# Patient Record
Sex: FEMALE | Race: WHITE | NOT HISPANIC OR LATINO | URBAN - METROPOLITAN AREA
[De-identification: names, ages, dates, MRNs, and addresses within clinical notes are randomized per-mention and may not be internally consistent; named-entity substitution may affect disease eponyms.]

---

## 2022-03-21 ENCOUNTER — EMERGENCY (EMERGENCY)
Facility: HOSPITAL | Age: 72
LOS: 1 days | Discharge: HOME | End: 2022-03-21
Admitting: EMERGENCY MEDICINE
Payer: MEDICARE

## 2022-03-21 VITALS
HEART RATE: 75 BPM | TEMPERATURE: 97 F | OXYGEN SATURATION: 99 % | SYSTOLIC BLOOD PRESSURE: 123 MMHG | RESPIRATION RATE: 18 BRPM | HEIGHT: 67 IN | DIASTOLIC BLOOD PRESSURE: 45 MMHG

## 2022-03-21 DIAGNOSIS — Z98.89 OTHER SPECIFIED POSTPROCEDURAL STATES: Chronic | ICD-10-CM

## 2022-03-21 DIAGNOSIS — S62.101S FRACTURE OF UNSPECIFIED CARPAL BONE, RIGHT WRIST, SEQUELA: Chronic | ICD-10-CM

## 2022-03-21 DIAGNOSIS — Z46.51 ENCOUNTER FOR FITTING AND ADJUSTMENT OF GASTRIC LAP BAND: Chronic | ICD-10-CM

## 2022-03-21 PROCEDURE — 99283 EMERGENCY DEPT VISIT LOW MDM: CPT | Mod: FS

## 2022-03-21 RX ORDER — TETANUS TOXOID, REDUCED DIPHTHERIA TOXOID AND ACELLULAR PERTUSSIS VACCINE, ADSORBED 5; 2.5; 8; 8; 2.5 [IU]/.5ML; [IU]/.5ML; UG/.5ML; UG/.5ML; UG/.5ML
0.5 SUSPENSION INTRAMUSCULAR ONCE
Refills: 0 | Status: COMPLETED | OUTPATIENT
Start: 2022-03-21 | End: 2022-03-21

## 2022-03-21 RX ORDER — TRANEXAMIC ACID 100 MG/ML
5 INJECTION, SOLUTION INTRAVENOUS ONCE
Refills: 0 | Status: COMPLETED | OUTPATIENT
Start: 2022-03-21 | End: 2022-03-21

## 2022-03-21 RX ADMIN — TRANEXAMIC ACID 5 MILLILITER(S): 100 INJECTION, SOLUTION INTRAVENOUS at 21:36

## 2022-03-21 RX ADMIN — TETANUS TOXOID, REDUCED DIPHTHERIA TOXOID AND ACELLULAR PERTUSSIS VACCINE, ADSORBED 0.5 MILLILITER(S): 5; 2.5; 8; 8; 2.5 SUSPENSION INTRAMUSCULAR at 19:44

## 2022-03-21 NOTE — ED PROVIDER NOTE - NSICDXPASTSURGICALHX_GEN_ALL_CORE_FT
PAST SURGICAL HISTORY:  Fitting and adjustment of gastric lap band     Fracture of right wrist, sequela     S/P laparoscopic cholecystectomy

## 2022-03-21 NOTE — ED PROVIDER NOTE - NSICDXFAMILYHX_GEN_ALL_CORE_FT
No FAMILY HISTORY:  Family history of asthma    Sibling  Still living? Unknown  Family history of asthma, Age at diagnosis: Age Unknown

## 2022-03-21 NOTE — ED PROVIDER NOTE - OBJECTIVE STATEMENT
70 y/o F hx HTN and afib on Eliquis here c/o bleeding laceration to the left hand x 2 days. States she was cutting meat when she the palmar aspect of her left hand. States she cleaned the area and wrapped it at the time. Concerned for continued bleeding. Held her Eliquis yesterday. R hand dominant. Tetanus status unknown, no numbness/tingling/weakness to the hand.

## 2022-03-21 NOTE — ED PROVIDER NOTE - PHYSICAL EXAMINATION
Well appearing, well nourished, awake, alert, oriented to person, place, time/situation and in no apparent distress.    Airway patent    Eyes without scleral injection. No jaundice.    Strong pulse.    Respirations unlabored.      Spine appears normal, range of motion is not limited, no muscle or joint tenderness.    Alert and oriented, no gross motor or sensory deficits.    Skin normal color for race, warm, dry.    No SI/HI.    L hand: 2mm bleeding wound to the thenar eminence, controlled with holding pressure, no underlying erythema or warmth/tenderness to touch, no red streaking, no drainage, full ROM of all digits against resistance, radial pulse 2+, sensation intact, NVI

## 2022-03-21 NOTE — ED PROVIDER NOTE - NSICDXPASTMEDICALHX_GEN_ALL_CORE_FT
PAST MEDICAL HISTORY:  Atrial fibrillation, unspecified     Essential hypertension     Morbid obesity

## 2022-03-21 NOTE — ED PROVIDER NOTE - PRINCIPAL DIAGNOSIS
Laceration of finger of left hand without foreign body, initial encounter Laceration of skin of left hand

## 2022-03-21 NOTE — ED PROVIDER NOTE - PROGRESS NOTE DETAILS
TULIO Garcia: patient received sign out by TULIO Berkowitz, awaiting topical txa from pharmacy. Medication to be sent. Light bleeding seen through gauze, pending txa. Patient signed out to TULIO Marshall TULIO Garcia: patient received sign out by TULIO Berkowitz, awaiting topical txa from pharmacy. Medication to be sent. Light bleeding seen through gauze, pending txa. Patient signed out to TULIO Grewal TULIO Grewal: Received signout on pt. Dressing with TXA applied, hemostasis achieved, will D/C with outpatient follow up.

## 2022-03-21 NOTE — ED PROVIDER NOTE - PATIENT PORTAL LINK FT
You can access the FollowMyHealth Patient Portal offered by [facility] by registering at the following website: [email or web address]. By joining [facility]’s FollowMyHealth portal, you will also be able to view your health information using other applications (apps) compatible with our system.

## 2022-03-21 NOTE — ED PROVIDER NOTE - NSFOLLOWUPINSTRUCTIONS_ED_ALL_ED_FT
See your primary care doctor within 24-48 hours, bring copies of all reports with you. Keep the wound covered and dry.  Return to the ER for worsening symptoms or any other concerns. See your primary care doctor within 24-48 hours, bring copies of all reports with you. Keep the wound covered and dry.  Return to the ER for worsening symptoms or any other concerns. This includes but is not limited to increasing redness, swelling, pain or for any other symptoms that concern you.

## 2022-03-21 NOTE — ED PROVIDER NOTE - CLINICAL SUMMARY MEDICAL DECISION MAKING FREE TEXT BOX
70 y/o F W/ 2 day old superficial bleeding laceration, bleeding controlled with direct pressure. Area cleaned with chlorhexidine, Surgicel applied with hemostasis. Will update tetanus, return precautions given. 72 y/o F W/ 2 day old superficial bleeding laceration, bleeding slowed but still active. Area cleaned with chlorhexidine, Surgicel applied with hemostasis. Will update tetanus, return precautions given.

## 2022-03-21 NOTE — ED PROVIDER NOTE - CARE PLAN
Principal Discharge DX:	Laceration of finger of left hand without foreign body, initial encounter   1 Principal Discharge DX:	Laceration of skin of left hand

## 2022-03-21 NOTE — ED ADULT TRIAGE NOTE - CHIEF COMPLAINT QUOTE
Pt with laceration to left palm of hand . Pt knife cut her  this past saturday. Pt arrives with active bleeding new pressure dressing applied . pt states on eliquis did not take dose today.

## 2022-03-21 NOTE — ED PROVIDER NOTE - NS ED ATTENDING STATEMENT MOD
This was a shared visit with the SHELLY. I reviewed and verified the documentation and independently performed the documented:

## 2022-04-20 ENCOUNTER — EMERGENCY (EMERGENCY)
Facility: HOSPITAL | Age: 72
LOS: 1 days | Discharge: HOME | End: 2022-04-20
Attending: EMERGENCY MEDICINE | Admitting: EMERGENCY MEDICINE
Payer: MEDICARE

## 2022-04-20 VITALS
HEIGHT: 67 IN | RESPIRATION RATE: 18 BRPM | OXYGEN SATURATION: 100 % | HEART RATE: 55 BPM | TEMPERATURE: 99 F | DIASTOLIC BLOOD PRESSURE: 85 MMHG | SYSTOLIC BLOOD PRESSURE: 143 MMHG

## 2022-04-20 VITALS
TEMPERATURE: 98 F | HEART RATE: 94 BPM | RESPIRATION RATE: 16 BRPM | SYSTOLIC BLOOD PRESSURE: 132 MMHG | OXYGEN SATURATION: 100 % | DIASTOLIC BLOOD PRESSURE: 94 MMHG

## 2022-04-20 DIAGNOSIS — Z46.51 ENCOUNTER FOR FITTING AND ADJUSTMENT OF GASTRIC LAP BAND: Chronic | ICD-10-CM

## 2022-04-20 DIAGNOSIS — S62.101S FRACTURE OF UNSPECIFIED CARPAL BONE, RIGHT WRIST, SEQUELA: Chronic | ICD-10-CM

## 2022-04-20 DIAGNOSIS — Z98.89 OTHER SPECIFIED POSTPROCEDURAL STATES: Chronic | ICD-10-CM

## 2022-04-20 LAB
ALBUMIN SERPL ELPH-MCNC: 4.6 G/DL — SIGNIFICANT CHANGE UP (ref 3.3–5)
ALP SERPL-CCNC: 92 U/L — SIGNIFICANT CHANGE UP (ref 40–120)
ALT FLD-CCNC: 15 U/L — SIGNIFICANT CHANGE UP (ref 4–33)
ANION GAP SERPL CALC-SCNC: 15 MMOL/L — HIGH (ref 7–14)
APTT BLD: 33.6 SEC — SIGNIFICANT CHANGE UP (ref 27–36.3)
AST SERPL-CCNC: 23 U/L — SIGNIFICANT CHANGE UP (ref 4–32)
BASOPHILS # BLD AUTO: 0.01 K/UL — SIGNIFICANT CHANGE UP (ref 0–0.2)
BASOPHILS NFR BLD AUTO: 0.2 % — SIGNIFICANT CHANGE UP (ref 0–2)
BILIRUB SERPL-MCNC: 0.5 MG/DL — SIGNIFICANT CHANGE UP (ref 0.2–1.2)
BLD GP AB SCN SERPL QL: NEGATIVE — SIGNIFICANT CHANGE UP
BUN SERPL-MCNC: 21 MG/DL — SIGNIFICANT CHANGE UP (ref 7–23)
CALCIUM SERPL-MCNC: 9.6 MG/DL — SIGNIFICANT CHANGE UP (ref 8.4–10.5)
CHLORIDE SERPL-SCNC: 106 MMOL/L — SIGNIFICANT CHANGE UP (ref 98–107)
CO2 SERPL-SCNC: 24 MMOL/L — SIGNIFICANT CHANGE UP (ref 22–31)
CREAT SERPL-MCNC: 0.94 MG/DL — SIGNIFICANT CHANGE UP (ref 0.5–1.3)
EGFR: 64 ML/MIN/1.73M2 — SIGNIFICANT CHANGE UP
EOSINOPHIL # BLD AUTO: 0.14 K/UL — SIGNIFICANT CHANGE UP (ref 0–0.5)
EOSINOPHIL NFR BLD AUTO: 2.5 % — SIGNIFICANT CHANGE UP (ref 0–6)
GLUCOSE SERPL-MCNC: 90 MG/DL — SIGNIFICANT CHANGE UP (ref 70–99)
HCT VFR BLD CALC: 32.4 % — LOW (ref 34.5–45)
HGB BLD-MCNC: 9.7 G/DL — LOW (ref 11.5–15.5)
IANC: 3.18 K/UL — SIGNIFICANT CHANGE UP (ref 1.8–7.4)
IMM GRANULOCYTES NFR BLD AUTO: 0.4 % — SIGNIFICANT CHANGE UP (ref 0–1.5)
INR BLD: 1.14 RATIO — SIGNIFICANT CHANGE UP (ref 0.88–1.16)
LYMPHOCYTES # BLD AUTO: 1.63 K/UL — SIGNIFICANT CHANGE UP (ref 1–3.3)
LYMPHOCYTES # BLD AUTO: 29.2 % — SIGNIFICANT CHANGE UP (ref 13–44)
MCHC RBC-ENTMCNC: 24.8 PG — LOW (ref 27–34)
MCHC RBC-ENTMCNC: 29.9 GM/DL — LOW (ref 32–36)
MCV RBC AUTO: 82.9 FL — SIGNIFICANT CHANGE UP (ref 80–100)
MONOCYTES # BLD AUTO: 0.6 K/UL — SIGNIFICANT CHANGE UP (ref 0–0.9)
MONOCYTES NFR BLD AUTO: 10.8 % — SIGNIFICANT CHANGE UP (ref 2–14)
NEUTROPHILS # BLD AUTO: 3.18 K/UL — SIGNIFICANT CHANGE UP (ref 1.8–7.4)
NEUTROPHILS NFR BLD AUTO: 56.9 % — SIGNIFICANT CHANGE UP (ref 43–77)
NRBC # BLD: 0 /100 WBCS — SIGNIFICANT CHANGE UP
NRBC # FLD: 0 K/UL — SIGNIFICANT CHANGE UP
PLATELET # BLD AUTO: 263 K/UL — SIGNIFICANT CHANGE UP (ref 150–400)
POTASSIUM SERPL-MCNC: 4.3 MMOL/L — SIGNIFICANT CHANGE UP (ref 3.5–5.3)
POTASSIUM SERPL-SCNC: 4.3 MMOL/L — SIGNIFICANT CHANGE UP (ref 3.5–5.3)
PROT SERPL-MCNC: 7.5 G/DL — SIGNIFICANT CHANGE UP (ref 6–8.3)
PROTHROM AB SERPL-ACNC: 13.2 SEC — SIGNIFICANT CHANGE UP (ref 10.5–13.4)
RBC # BLD: 3.91 M/UL — SIGNIFICANT CHANGE UP (ref 3.8–5.2)
RBC # FLD: 15.5 % — HIGH (ref 10.3–14.5)
RH IG SCN BLD-IMP: POSITIVE — SIGNIFICANT CHANGE UP
SARS-COV-2 RNA SPEC QL NAA+PROBE: SIGNIFICANT CHANGE UP
SODIUM SERPL-SCNC: 145 MMOL/L — SIGNIFICANT CHANGE UP (ref 135–145)
TROPONIN T, HIGH SENSITIVITY RESULT: 17 NG/L — SIGNIFICANT CHANGE UP
TROPONIN T, HIGH SENSITIVITY RESULT: 19 NG/L — SIGNIFICANT CHANGE UP
WBC # BLD: 5.58 K/UL — SIGNIFICANT CHANGE UP (ref 3.8–10.5)
WBC # FLD AUTO: 5.58 K/UL — SIGNIFICANT CHANGE UP (ref 3.8–10.5)

## 2022-04-20 PROCEDURE — 99285 EMERGENCY DEPT VISIT HI MDM: CPT | Mod: FS

## 2022-04-20 PROCEDURE — 72170 X-RAY EXAM OF PELVIS: CPT | Mod: 26

## 2022-04-20 PROCEDURE — 71045 X-RAY EXAM CHEST 1 VIEW: CPT | Mod: 26

## 2022-04-20 PROCEDURE — 73590 X-RAY EXAM OF LOWER LEG: CPT | Mod: 26,RT

## 2022-04-20 PROCEDURE — 73030 X-RAY EXAM OF SHOULDER: CPT | Mod: 26,RT

## 2022-04-20 PROCEDURE — 72125 CT NECK SPINE W/O DYE: CPT | Mod: 26,MA

## 2022-04-20 PROCEDURE — 70450 CT HEAD/BRAIN W/O DYE: CPT | Mod: 26,MA

## 2022-04-20 PROCEDURE — 71250 CT THORAX DX C-: CPT | Mod: 26,MA

## 2022-04-20 RX ORDER — LIDOCAINE 4 G/100G
1 CREAM TOPICAL
Qty: 14 | Refills: 0
Start: 2022-04-20 | End: 2022-05-03

## 2022-04-20 RX ORDER — LIDOCAINE 4 G/100G
1 CREAM TOPICAL ONCE
Refills: 0 | Status: COMPLETED | OUTPATIENT
Start: 2022-04-20 | End: 2022-04-20

## 2022-04-20 RX ORDER — ACETAMINOPHEN 500 MG
975 TABLET ORAL ONCE
Refills: 0 | Status: COMPLETED | OUTPATIENT
Start: 2022-04-20 | End: 2022-04-20

## 2022-04-20 RX ADMIN — LIDOCAINE 1 PATCH: 4 CREAM TOPICAL at 23:15

## 2022-04-20 RX ADMIN — Medication 975 MILLIGRAM(S): at 18:55

## 2022-04-20 NOTE — ED ADULT NURSE NOTE - HIV OFFER
The patient reports to the clinic for evaluation of ringing in their ears.  The pt states this has been going on for months.  They state it has come on gradually.  They state the tinnitus is non pulsatile, high frequency, hissing, bilateral, better when they are distracted and worse when it is quiet and they focus on it.  They state they have no ear pain, no ear drainage, no sudden change in their hearing and no history of ear surgery.  They are otherwise in their usual state of health.  They have no Family history of ear problems, ear surgery or hearing loss There is no past history of noise exposure.        Past Medical History:   Diagnosis Date   • ADHD (attention deficit hyperactivity disorder)    • Eczema         No past surgical history on file.    ALLERGIES: no known allergies.    No current outpatient medications on file.     No current facility-administered medications for this visit.          Physical Exam:  General:  Breana Freed is a healthy appearing 14 year old female in no acute distress.  Skin color and turgor are normal. Mood and affect are normal.   Neurologic:  Cranial nerves II-XII are grossly intact bilaterally.  Patient is oriented x 3.   Ears:  External ears are normal, ear canals are clear, TMs pearly white and translucent bilaterally without fluid behind them.   Nose:  External nose is normal, nasal mucosa and turbinates appear pink and healthy without lesion/discharge/mass.    Throat:  Oropharynx mucosa, the hard and soft palate, buccal mucosa, and tongue appear healthy and normal without lesion or discharge.  Lips, gums and teeth appear healthy and normal.  Neck:  Neck is symmetric and supple without adenopathy.  Salivary and thyroid glands appear healthy and normal without nodularity or tenderness.      Audiogram reveals normal hearing.    Impression:  Normal hearing, normal ear exam.  Bilateral benign sounding tinnitus.    Plan:  Discussed with the pt in depth the nature of tinnitus,  tinnitus masking techniques, his hearing loss and benign presentation.  Discussed lipoflavonoid supplementation to see if there is any trace vitamin deficiency.  Recommend f/u in 6 mo w/ audiogram.      Previously Declined (within the last year)

## 2022-04-20 NOTE — ED PROVIDER NOTE - PROGRESS NOTE DETAILS
Naty: patient signed out to me. Offered CDU for stress/echo but patient prefers to follow up with her cardiologist. Rpt Trop negative for ACS. Naty: patient signed out to me. Offered CDU for stress/echo but patient prefers to follow up with her cardiologist. Rpt Trop negative for ACS.  Patient ambulatory

## 2022-04-20 NOTE — ED PROVIDER NOTE - NSFOLLOWUPINSTRUCTIONS_ED_ALL_ED_FT
Please follow up with your cardiologist as planned  Return to the emergency department for worsening chest pain, dizziness, fainting, palpitations or difficulty breathing.   Keep hydrated.

## 2022-04-20 NOTE — ED PROVIDER NOTE - NSICDXFAMILYHX_GEN_ALL_CORE_FT
FAMILY HISTORY:  Family history of asthma    Sibling  Still living? Unknown  Family history of asthma, Age at diagnosis: Age Unknown

## 2022-04-20 NOTE — ED PROVIDER NOTE - PHYSICAL EXAMINATION
A comprehensive trauma exam was performed. No hematoma or skull tenderness or deformity, no tenderness or abnormality of facial bones. No spinous process or paraspinal muscle tenderness of the cervical, lumbar or thoracic spine. + Diffuse R sided chest wall tenderness without crepitus. Abdomen is soft, non-tender no guarding. Pelvis is stable. No tenderness or may deformity of upper or lower extremity, no signs of dislocation, no scaphoid tenderness. No other traumatic abnormality on comprehensive exam.

## 2022-04-20 NOTE — ED PROVIDER NOTE - OBJECTIVE STATEMENT
71 Y/O F PMH HTN A FIb on Eliquis states that 2 hours prior to arrival she was standing in her bedroom when she felt dizzy and fell to the ground. Pt states that she did not have CP or palpitations, denies LOC. Pt states that since the fall she developed R sided chest pain which she states is currently 7/10 and worse with deep breathing. 73 Y/O F PMH HTN A FIb on Eliquis states that 2 hours prior to arrival she was standing in her bedroom when she felt dizzy and fell to the ground. Pt states that she did not have CP or palpitations, denies LOC. Pt states that since the fall she developed R sided chest pain which she states is currently 7/10 and worse with deep breathing. Pt states she did not take any medication for pain prior to arrival. Pt states she had a stress test and echocardiogram in the past but does not recall when. Pt denies any other sx or acute complaints.

## 2022-04-20 NOTE — ED PROVIDER NOTE - CLINICAL SUMMARY MEDICAL DECISION MAKING FREE TEXT BOX
71 Y/O F PMH HTN A FIb on Eliquis states that 2 hours prior to arrival she was standing in her bedroom when she felt dizzy and fell to the ground. Pt states that she did not have CP or palpitations, denies LOC. Pt states that since the fall she developed R sided chest pain which she states is currently 7/10 and worse with deep breathing. Plan is labs to eval for anemia or electrolyte disturbance, CXR to eval for consolidation, CT to eval for acute traumatic abnormality.

## 2022-04-20 NOTE — ED PROVIDER NOTE - PATIENT PORTAL LINK FT
You can access the FollowMyHealth Patient Portal offered by [facility] by registering at the following website: [email or web address]. By joining [facility]’s FollowMyHealth portal, you will also be able to view your health information using other applications (apps) compatible with our system.

## 2022-04-20 NOTE — ED ADULT NURSE NOTE - OBJECTIVE STATEMENT
Receive pt. in ER room 23 alert and oriented x 4, presenting to the ER with complaints of a fall and pain. Pt. stated " I tripped and fell inside the house and I fell on my right side , I hit my chest , I have pain in my right leg , right arm and right chest". Pt. states that she did not pass out, place on cardiac monitor, labs sent, medicated as ordered. Call bell place within reach.

## 2022-04-20 NOTE — ED ADULT TRIAGE NOTE - CHIEF COMPLAINT QUOTE
Pt brought in by EMS from home complaining of a syncopal episode. Pt states she felt dizzy prior to syncopal episode, pt complaining of chest pain. Pt denies sob, n/v/d, fever or chills.

## 2022-04-20 NOTE — ED ADULT NURSE REASSESSMENT NOTE - NS ED NURSE REASSESS COMMENT FT1
Report and pt received at shift change: Pt A&Ox4, respirations even and unlabored, sating 100% on RA. Pt c/o R back pain, pt offers no other complaints. Pending further orders. bed in lowest position, side rails up, call bell in hand, safety maintained. awaiting lab results  will continue to monitor.

## 2022-04-20 NOTE — ED PROVIDER NOTE - ATTENDING APP SHARED VISIT CONTRIBUTION OF CARE
Attending note:   After face to face evaluation of this patient, I concur with above noted hx, pe, and care plan for this patient.  Levine: 72 yof with HTN, afib on Eliquis complaining of dizziness prior to fall from standing height. Pt denies chest pain, palpitations or SOB prior to fall but has some pain on right side of body since. Pt is well appearing, no distress, obese. PE was remarkable for tenderness to chest wall on right side without ecchymosis or edema. rest of exam as noted above.   Pt with risk factors - age, cardiac hx - dizzy prior to fall with chest wall trauma on blood thinners - EKG, tele monitor, CT head/chest. pain meds, r/o cardiac causes of sxs and offer stress and echo in CDU and tele monitoring.

## 2022-04-20 NOTE — ED PROVIDER NOTE - NS ED ATTENDING STATEMENT MOD
No
This was a shared visit with the SHELLY. I reviewed and verified the documentation and independently performed the documented:

## 2024-02-27 ENCOUNTER — APPOINTMENT (OUTPATIENT)
Dept: ORTHOPEDIC SURGERY | Facility: CLINIC | Age: 74
End: 2024-02-27
Payer: MEDICARE

## 2024-02-27 VITALS
OXYGEN SATURATION: 96 % | HEART RATE: 111 BPM | TEMPERATURE: 97.4 F | DIASTOLIC BLOOD PRESSURE: 80 MMHG | BODY MASS INDEX: 32.33 KG/M2 | HEIGHT: 67 IN | WEIGHT: 206 LBS | SYSTOLIC BLOOD PRESSURE: 141 MMHG

## 2024-02-27 DIAGNOSIS — Z86.79 PERSONAL HISTORY OF OTHER DISEASES OF THE CIRCULATORY SYSTEM: ICD-10-CM

## 2024-02-27 DIAGNOSIS — Z87.39 PERSONAL HISTORY OF OTHER DISEASES OF THE MUSCULOSKELETAL SYSTEM AND CONNECTIVE TISSUE: ICD-10-CM

## 2024-02-27 DIAGNOSIS — M25.511 PAIN IN RIGHT SHOULDER: ICD-10-CM

## 2024-02-27 PROBLEM — Z00.00 ENCOUNTER FOR PREVENTIVE HEALTH EXAMINATION: Status: ACTIVE | Noted: 2024-02-27

## 2024-02-27 PROCEDURE — 99204 OFFICE O/P NEW MOD 45 MIN: CPT

## 2024-02-27 PROCEDURE — 73030 X-RAY EXAM OF SHOULDER: CPT | Mod: RT

## 2024-02-27 NOTE — HISTORY OF PRESENT ILLNESS
[de-identified] : 73-year-old female right-hand-dominant who is visiting family in [place] she sustained a ground-level fall onto her right shoulder.  She was seen in emergency department there told she had a fracture and placed in a sling.  She has been in sling since the injury occurred which was on February 2, 2024.  Pain has been improving.  Still having pain.  Requiring oxycodone occasionally.  Also requiring Tylenol and ibuprofen.  Denies any numbness or tingling.  Does report trouble with daily tasks such as going to the bathroom and dressing himself due to the pain.

## 2024-02-27 NOTE — ASSESSMENT
[FreeTextEntry1] : 73-year-old female right-hand-dominant with a closed 2 part surgical neck fracture of the right proximal humerus - Had long discussion with the patient about treatment options.  We also went over the x-rays in detail.  Discussed with patient that the fact that her pain is improving and on exam her humerus moves it is unit she has evidence of current healing.  2 options would be open reduction internal fixation versus continued closed treatment.  Surgery would come with the risk of surgery such as infection pain risk of anesthesia.  Surgery would entail closer anatomical reduction however due to her bone poor bone quality failure is potential.  Closed treatment would be continued treatment conservatively in a sling.  The alignment is not perfect but the fracture heals in this position patient will have adequate function of the shoulder.  We did discuss that her shoulder motion will likely not get to the point where it was prior to the injury however our goal would be to get as much range of motion and function as possible.  After this long discussion patient elected to proceed with closed treatment of right proximal humerus fracture surgical neck without manipulation.  Discussed with patient that we will have to follow her closely to watch for fracture healing.  If fracture does not heal and may force her hands towards potential surgical intervention.  Patient will follow-up in 3 weeks.  Discussed with patient that if she has any issues or increased pain she is to call to see me sooner.  Patient was provided for OT referral for range of motion of her elbow and wrist.  She is also okay to start pendulum exercises to her right shoulder however no passive or active motion at this point.  Patient to remove sling when she is at home to work on elbow range of motion.  Prescription for tramadol was sent to patient's pharmacy to help with her pain control.

## 2024-02-27 NOTE — PHYSICAL EXAM
[de-identified] : Right upper extremity sign-skin intact bruising and swelling present - Patient able to extend wrist thumbs up okay sign cross second third digit make a fist - Sensation intact light touch axillary radial ulnar and median nerve distributions - Hand warm well-perfused - Limited motion of shoulder due to pain. - Elbow range of motion supination 45 degrees pronation 45 degrees - Elbow flexed -40 degrees to 100 degrees [de-identified] : 3 views of the right shoulder obtained today - Demonstrates a surgical neck fracture displaced globally well aligned no current fracture callus formation

## 2024-03-04 RX ORDER — TRAMADOL HYDROCHLORIDE 50 MG/1
50 TABLET, COATED ORAL
Qty: 28 | Refills: 0 | Status: ACTIVE | COMMUNITY
Start: 2024-03-04 | End: 1900-01-01

## 2024-03-19 ENCOUNTER — APPOINTMENT (OUTPATIENT)
Dept: ORTHOPEDIC SURGERY | Facility: CLINIC | Age: 74
End: 2024-03-19
Payer: MEDICARE

## 2024-03-19 VITALS — HEIGHT: 67 IN | WEIGHT: 215 LBS | BODY MASS INDEX: 33.74 KG/M2

## 2024-03-19 PROCEDURE — 99213 OFFICE O/P EST LOW 20 MIN: CPT

## 2024-03-19 PROCEDURE — 73030 X-RAY EXAM OF SHOULDER: CPT | Mod: RT

## 2024-03-20 NOTE — PHYSICAL EXAM
[de-identified] : Right upper extremity  -skin intact bruising and swelling improved - Patient able to extend wrist thumbs up okay sign cross second third digit make a fist - Sensation intact light touch axillary radial ulnar and median nerve distributions - Hand warm well-perfused - Limited motion of shoulder due to pain however improved since last visit - Elbow range of motion supination and pronation congruent with contralateral side - Elbow flexed -10 degrees to 120 degrees [de-identified] : 3 views of the right shoulder obtained today -Proximal humerus fracture impacted with surgical neck and tuberosity components.  Alignment maintained.  Evidence of callus formation.

## 2024-03-20 NOTE — ASSESSMENT
[FreeTextEntry1] : 73-year-old female right-hand-dominant with a right proximal humerus fracture impacted surgical neck and greater tuberosity -Again discussed potential treatment options.  At last visit we elected to proceed with nonoperative management.  Again today we revisited potential surgical intervention.  Patient's pain has improved but she still having pain.  I did discuss with patient potential open reduction internal fixation versus reverse total shoulder arthroplasty.  Still at this time patient would like to avoid surgery.  Nonoperative management is reasonable given overall global alignment is maintained.  There is some evidence of healing on x-ray as well.  Patient should continue physical therapy.  Would restrict her weightbearing to 10 pounds.  Patient okay for active range of motion active assist range of motion and passive range of motion.  Continue over-the-counter NSAIDs for pain.  We have the patient follow-up in 6 weeks

## 2024-03-20 NOTE — HISTORY OF PRESENT ILLNESS
[de-identified] : 73-year-old female right-hand-dominant who is visiting family in [place] she sustained a ground-level fall onto her right shoulder.  She was seen in emergency department there told she had a fracture and placed in a sling.  She has been in sling since the injury occurred which was on February 2, 2024.  Pain has been improving.  Still having pain.  Requiring oxycodone occasionally.  Also requiring Tylenol and ibuprofen.  Denies any numbness or tingling.  Does report trouble with daily tasks such as going to the bathroom and dressing himself due to the pain.  3/19/2024: Overall patient's pain has improved.  She has been doing the home exercises.  Physical therapy is coming to her house to work on range of motion.

## 2024-05-07 ENCOUNTER — APPOINTMENT (OUTPATIENT)
Dept: ORTHOPEDIC SURGERY | Facility: CLINIC | Age: 74
End: 2024-05-07
Payer: MEDICARE

## 2024-05-07 VITALS — BODY MASS INDEX: 33.74 KG/M2 | WEIGHT: 215 LBS | HEIGHT: 67 IN

## 2024-05-07 DIAGNOSIS — S42.221A 2-PART DISPLACED FRACTURE OF SURGICAL NECK OF RIGHT HUMERUS, INITIAL ENCOUNTER FOR CLOSED FRACTURE: ICD-10-CM

## 2024-05-07 PROCEDURE — 99213 OFFICE O/P EST LOW 20 MIN: CPT

## 2024-05-07 PROCEDURE — 73060 X-RAY EXAM OF HUMERUS: CPT

## 2024-06-13 PROBLEM — S42.221A CLOSED 2-PART DISPLACED FRACTURE OF SURGICAL NECK OF RIGHT HUMERUS, INITIAL ENCOUNTER: Status: ACTIVE | Noted: 2024-02-27

## 2024-06-13 NOTE — ASSESSMENT
[FreeTextEntry1] : 73-year-old female right-hand-dominant with a right proximal humerus fracture impacted surgical neck and greater tuberosity -Patient doing well.  Fracture appears healed on x-ray.  Patient should continue physical therapy to work on strengthening and range of motion.  Patient can be weight-bear as tolerated and return to activities as tolerated.  Follow-up in 3 months

## 2024-06-13 NOTE — HISTORY OF PRESENT ILLNESS
[de-identified] : 73-year-old female right-hand-dominant who is visiting family in [place] she sustained a ground-level fall onto her right shoulder.  She was seen in emergency department there told she had a fracture and placed in a sling.  She has been in sling since the injury occurred which was on February 2, 2024.  Pain has been improving.  Still having pain.  Requiring oxycodone occasionally.  Also requiring Tylenol and ibuprofen.  Denies any numbness or tingling.  Does report trouble with daily tasks such as going to the bathroom and dressing himself due to the pain.  3/19/2024: Overall patient's pain has improved.  She has been doing the home exercises.  Physical therapy is coming to her house to work on range of motion.  5/7/2024: Patient continues to improve.  Range of motion.

## 2024-06-13 NOTE — PHYSICAL EXAM
[de-identified] : Right upper extremity  -skin intact  - Patient able to extend wrist thumbs up okay sign cross second third digit make a fist - Sensation intact light touch axillary radial ulnar and median nerve distributions - Hand warm well-perfused -Shoulder motion significantly improved - Elbow range of motion supination and pronation congruent with contralateral side  [de-identified] : 3 views of the right shoulder obtained showing fracture healed

## 2024-08-13 ENCOUNTER — APPOINTMENT (OUTPATIENT)
Dept: ORTHOPEDIC SURGERY | Facility: CLINIC | Age: 74
End: 2024-08-13
Payer: MEDICARE

## 2024-08-13 VITALS — WEIGHT: 215 LBS | BODY MASS INDEX: 33.74 KG/M2 | HEIGHT: 67 IN

## 2024-08-13 DIAGNOSIS — S42.222S: ICD-10-CM

## 2024-08-13 DIAGNOSIS — S42.221S: ICD-10-CM

## 2024-08-13 PROCEDURE — 99213 OFFICE O/P EST LOW 20 MIN: CPT

## 2024-08-13 PROCEDURE — 73030 X-RAY EXAM OF SHOULDER: CPT | Mod: RT

## 2024-08-13 NOTE — ASSESSMENT
[FreeTextEntry1] : 73-year-old female right-hand-dominant with a right proximal humerus fracture impacted surgical neck and greater tuberosity -Patient doing well.  Fracture healed.  Her range of motion has improved.  Patient would benefit greatly from continued physical therapy work on range of motion and strengthening.  She had old injury to her left shoulder that would also benefit greatly from physical therapy.  New physical therapy referral was provided today.  With the patient follow-up in 3 months no new x-rays needed.

## 2024-08-13 NOTE — PHYSICAL EXAM
[de-identified] : Right upper extremity  -skin intact  - Patient able to extend wrist thumbs up okay sign cross second third digit make a fist - Sensation intact light touch axillary radial ulnar and median nerve distributions - Hand warm well-perfused -Shoulder motion significantly improved forward flexion to 120 degrees - Elbow range of motion supination and pronation congruent with contralateral side  [de-identified] : 3 views of the right shoulder obtained showing fracture healed [Nasal Discharge] : nasal discharge [Cough] : cough [Nasal Congestion] : nasal congestion [Negative] : Genitourinary

## 2024-08-13 NOTE — HISTORY OF PRESENT ILLNESS
[de-identified] : 73-year-old female right-hand-dominant who is visiting family in [place] she sustained a ground-level fall onto her right shoulder.  She was seen in emergency department there told she had a fracture and placed in a sling.  She has been in sling since the injury occurred which was on February 2, 2024.  Pain has been improving.  Still having pain.  Requiring oxycodone occasionally.  Also requiring Tylenol and ibuprofen.  Denies any numbness or tingling.  Does report trouble with daily tasks such as going to the bathroom and dressing himself due to the pain.  3/19/2024: Overall patient's pain has improved.  She has been doing the home exercises.  Physical therapy is coming to her house to work on range of motion.  5/7/2024: Patient continues to improve.  Range of motion.  8/13/2024: Overall patient doing okay and her range of motion is significantly.  She does have chronic pain in the right shoulder

## 2024-10-01 ENCOUNTER — APPOINTMENT (OUTPATIENT)
Dept: ORTHOPEDIC SURGERY | Facility: CLINIC | Age: 74
End: 2024-10-01
Payer: MEDICARE

## 2024-10-01 VITALS — WEIGHT: 215 LBS | BODY MASS INDEX: 33.74 KG/M2 | HEIGHT: 67 IN

## 2024-10-01 DIAGNOSIS — M75.41 IMPINGEMENT SYNDROME OF RIGHT SHOULDER: ICD-10-CM

## 2024-10-01 DIAGNOSIS — S42.222S: ICD-10-CM

## 2024-10-01 PROCEDURE — 99213 OFFICE O/P EST LOW 20 MIN: CPT

## 2024-10-01 PROCEDURE — 73030 X-RAY EXAM OF SHOULDER: CPT | Mod: RT

## 2024-10-01 RX ORDER — DICLOFENAC SODIUM 10 MG/G
1 GEL TOPICAL DAILY
Qty: 1 | Refills: 3 | Status: ACTIVE | COMMUNITY
Start: 2024-10-01 | End: 1900-01-01

## 2024-10-01 NOTE — HISTORY OF PRESENT ILLNESS
[de-identified] : 73-year-old female right-hand-dominant who is visiting family in [place] she sustained a ground-level fall onto her right shoulder.  She was seen in emergency department there told she had a fracture and placed in a sling.  She has been in sling since the injury occurred which was on February 2, 2024.  Pain has been improving.  Still having pain.  Requiring oxycodone occasionally.  Also requiring Tylenol and ibuprofen.  Denies any numbness or tingling.  Does report trouble with daily tasks such as going to the bathroom and dressing himself due to the pain.  3/19/2024: Overall patient's pain has improved.  She has been doing the home exercises.  Physical therapy is coming to her house to work on range of motion.  5/7/2024: Patient continues to improve.  Range of motion.  8/13/2024: Overall patient doing okay and her range of motion is significantly.  She does have chronic pain in the right shoulder  10/1/24 - had an increase in pain recently after lifting something heavy, now improving

## 2024-10-01 NOTE — PHYSICAL EXAM
[de-identified] : Right upper extremity  -skin intact  - Patient able to extend wrist thumbs up okay sign cross second third digit make a fist - Sensation intact light touch axillary radial ulnar and median nerve distributions - Hand warm well-perfused -Shoulder motion significantly improved forward flexion to 120 degrees - Elbow range of motion supination and pronation congruent with contralateral side  [de-identified] : 3 views of the right shoulder obtained showing fracture healed- no new injury

## 2024-10-01 NOTE — ASSESSMENT
[FreeTextEntry1] : 73-year-old female right-hand-dominant with a right proximal humerus fracture impacted surgical neck and greater tuberosity -no new injury on xray- i believe she is having some rotator cuff impingement, Recommended injection however patient states she has an allergy to prednisone and novicaine.  Recommend volateran gel- sent to her pharmayc - recommend restarting PT- Home PT order placed as patient has mobility issues and has trouble getting to places. Will benefit greatly from home therapy - follow up in 6 weeks

## 2024-11-12 ENCOUNTER — APPOINTMENT (OUTPATIENT)
Dept: ORTHOPEDIC SURGERY | Facility: CLINIC | Age: 74
End: 2024-11-12

## 2024-11-12 VITALS — WEIGHT: 215 LBS | BODY MASS INDEX: 33.74 KG/M2 | HEIGHT: 67 IN

## 2024-11-12 PROCEDURE — 73110 X-RAY EXAM OF WRIST: CPT | Mod: RT

## 2024-11-12 PROCEDURE — 99214 OFFICE O/P EST MOD 30 MIN: CPT

## 2024-11-12 PROCEDURE — 73090 X-RAY EXAM OF FOREARM: CPT | Mod: LT

## 2024-11-12 RX ORDER — NABUMETONE 500 MG/1
500 TABLET, FILM COATED ORAL
Qty: 60 | Refills: 0 | Status: ACTIVE | COMMUNITY
Start: 2024-11-12 | End: 1900-01-01

## 2024-11-19 ENCOUNTER — APPOINTMENT (OUTPATIENT)
Dept: ORTHOPEDIC SURGERY | Facility: CLINIC | Age: 74
End: 2024-11-19

## 2024-11-25 PROBLEM — M18.11 PRIMARY OSTEOARTHRITIS OF FIRST CARPOMETACARPAL JOINT OF RIGHT HAND: Status: ACTIVE | Noted: 2024-11-25

## 2024-12-26 ENCOUNTER — RX RENEWAL (OUTPATIENT)
Age: 74
End: 2024-12-26

## 2025-02-18 ENCOUNTER — APPOINTMENT (OUTPATIENT)
Dept: ORTHOPEDIC SURGERY | Facility: CLINIC | Age: 75
End: 2025-02-18